# Patient Record
Sex: FEMALE | Race: WHITE
[De-identification: names, ages, dates, MRNs, and addresses within clinical notes are randomized per-mention and may not be internally consistent; named-entity substitution may affect disease eponyms.]

---

## 2017-06-02 ENCOUNTER — HOSPITAL ENCOUNTER (OUTPATIENT)
Dept: HOSPITAL 5 - TRG | Age: 32
Discharge: HOME | End: 2017-06-02
Attending: OBSTETRICS & GYNECOLOGY
Payer: MEDICAID

## 2017-06-02 DIAGNOSIS — Z3A.36: ICD-10-CM

## 2017-06-02 DIAGNOSIS — O47.03: Primary | ICD-10-CM

## 2017-06-02 PROCEDURE — 96360 HYDRATION IV INFUSION INIT: CPT

## 2017-06-03 VITALS — SYSTOLIC BLOOD PRESSURE: 122 MMHG | DIASTOLIC BLOOD PRESSURE: 70 MMHG

## 2019-11-21 ENCOUNTER — HOSPITAL ENCOUNTER (OUTPATIENT)
Dept: HOSPITAL 5 - TRG | Age: 34
Discharge: HOME | End: 2019-11-21
Attending: OBSTETRICS & GYNECOLOGY
Payer: COMMERCIAL

## 2019-11-21 VITALS — DIASTOLIC BLOOD PRESSURE: 66 MMHG | SYSTOLIC BLOOD PRESSURE: 120 MMHG

## 2019-11-21 DIAGNOSIS — O46.92: Primary | ICD-10-CM

## 2019-11-21 DIAGNOSIS — Z3A.20: ICD-10-CM

## 2019-11-21 LAB
BACTERIA #/AREA URNS HPF: (no result) /HPF
BILIRUB UR QL STRIP: (no result)
BLOOD UR QL VISUAL: (no result)
PH UR STRIP: 7 [PH] (ref 5–7)
PROT UR STRIP-MCNC: (no result) MG/DL
RBC #/AREA URNS HPF: 3 /HPF (ref 0–6)
UROBILINOGEN UR-MCNC: < 2 MG/DL (ref ?–2)
WBC #/AREA URNS HPF: 6 /HPF (ref 0–6)

## 2019-11-21 PROCEDURE — 81001 URINALYSIS AUTO W/SCOPE: CPT

## 2019-11-21 PROCEDURE — 76805 OB US >/= 14 WKS SNGL FETUS: CPT

## 2019-11-21 NOTE — ULTRASOUND REPORT
Obstetrical ultrasound. 11/21/2019.



HISTORY: Vaginal bleeding.



FINDINGS: A single viable intrauterine pregnancy in the breech presentation is dated 21 weeks 0 days.
 The placenta is located at the fundus. Negative for abruption.



Fetal heart tones are 143 bpm. Cervical length is 3.1 cm.



Estimated fetal weight is 411 g. Amniotic fluid is subjectively normal.



IMPRESSION:

1. Early viable intrauterine pregnancy dated 21 weeks 0 days.

2. Negative for abruption.



Signer Name: David Espinoza MD 

Signed: 11/21/2019 10:52 PM

 Workstation Name: [id number]

## 2019-12-27 ENCOUNTER — HOSPITAL ENCOUNTER (EMERGENCY)
Dept: HOSPITAL 5 - ED | Age: 34
Discharge: HOME | End: 2019-12-27
Payer: MEDICAID

## 2019-12-27 VITALS — DIASTOLIC BLOOD PRESSURE: 79 MMHG | SYSTOLIC BLOOD PRESSURE: 118 MMHG

## 2019-12-27 DIAGNOSIS — J11.1: ICD-10-CM

## 2019-12-27 DIAGNOSIS — O99.513: Primary | ICD-10-CM

## 2019-12-27 DIAGNOSIS — Z3A.28: ICD-10-CM

## 2019-12-27 PROCEDURE — 99282 EMERGENCY DEPT VISIT SF MDM: CPT

## 2019-12-27 NOTE — EVENT NOTE
ED Screening Note


ED Screening Note: 





 7 months pregnant


headache


fever 


body aches 


no vomiting 


no diarrhea 


yesterday 


no abd pain no vaginal 


no PMHx


no allergies to meds


OB/GYN: [facility]

## 2019-12-27 NOTE — EMERGENCY DEPARTMENT REPORT
- General


Chief Complaint: Weakness


Stated Complaint: COLD/FLU SX


Time Seen by Provider: 19 14:21


Source: patient


Mode of arrival: Ambulatory


Limitations: Language Barrier





- History of Present Illness


Initial Comments: 





pt is a 33 yo female who presents to the ED with c/o flu symptoms that began 

yesterday. she is currently 7 months pregnant. she has associated headache, feve

r. she denies any vomiting, diarrhea,  abd pain or vaginal bleeding. no PMHx. no

allergies to meds. OB/GYN: [facility]. her children at home also have flu 

like symptoms. 








- Related Data


                                Home Medications











 Medication  Instructions  Recorded  Confirmed  Last Taken


 


Prenatal Plus Multivitamin Tab 1 tab PO DAILY 16 09:00








                                  Previous Rx's











 Medication  Instructions  Recorded  Last Taken  Type


 


Pnv,Calcium 72/Iron/Folic Acid 1 each PO DAILY #30 tablet 07/18/15 Unknown Rx





[Prenatal Plus Tablet]    


 


Ibuprofen [Motrin 600 MG tab] 600 mg PO Q8H PRN #30 tablet 16 Unknown Rx


 


Multivitamin with Iron 1 each PO DAILY #30 tablet 16 Unknown Rx





[Multivitamins with Iron]    


 


oxyCODONE /ACETAMINOPHEN [Percocet 1 tab PO Q6HR PRN #30 tablet 16 Unknown

 Rx





5/325]    


 


medroxyPROGESTERone ACETATE 10 mg PO QDAY #10 tablet 16 Unknown Rx





[Provera]    


 


traMADoL [Ultram] 50 mg PO Q6HR PRN #14 tablet 16 Unknown Rx


 


Oseltamivir [Tamiflu] 75 mg PO BID 5 Days #10 cap 19 Unknown Rx











                                    Allergies











Allergy/AdvReac Type Severity Reaction Status Date / Time


 


No Known Allergies Allergy   Verified 19 08:51














ED Review of Systems


ROS: 


Stated complaint: COLD/FLU SX


Other details as noted in HPI





Comment: All other systems reviewed and negative





ED Past Medical Hx





- Past Medical History


Previous Medical History?: Yes


Hx Hypertension: No


Hx Congestive Heart Failure: No


Hx Diabetes: No


Hx Deep Vein Thrombosis: No


Hx Renal Disease: No


Hx Sickle Cell Disease: No


Hx Seizures: No


Hx Psychiatric Treatment: Yes (DEPRESSION)


Hx Asthma: No


Hx COPD: No


Hx HIV: No


Additional medical history: Pt had high blood pressure during pregnancy





- Surgical History


Past Surgical History?: Yes


Additional Surgical History:  x 3





- Social History


Smoking Status: Never Smoker





- Medications


Home Medications: 


                                Home Medications











 Medication  Instructions  Recorded  Confirmed  Last Taken  Type


 


Pnv,Calcium 72/Iron/Folic Acid 1 each PO DAILY #30 tablet 07/18/15  Unknown Rx





[Prenatal Plus Tablet]     


 


Prenatal Plus Multivitamin Tab 1 tab PO DAILY 16 09:00 

History


 


Ibuprofen [Motrin 600 MG tab] 600 mg PO Q8H PRN #30 tablet 16  Unknown Rx


 


Multivitamin with Iron 1 each PO DAILY #30 tablet 16  Unknown Rx





[Multivitamins with Iron]     


 


oxyCODONE /ACETAMINOPHEN [Percocet 1 tab PO Q6HR PRN #30 tablet 16  

Unknown Rx





5/325]     


 


medroxyPROGESTERone ACETATE 10 mg PO QDAY #10 tablet 16  Unknown Rx





[Provera]     


 


traMADoL [Ultram] 50 mg PO Q6HR PRN #14 tablet 16  Unknown Rx


 


Oseltamivir [Tamiflu] 75 mg PO BID 5 Days #10 cap 19  Unknown Rx














ED Physical Exam





- General


Limitations: No Limitations


General appearance: alert, in no apparent distress





- Head


Head exam: Present: atraumatic, normocephalic





- Eye


Eye exam: Present: normal appearance





- ENT


ENT exam: Present: normal orophraynx, mucous membranes moist, TM's normal 

bilaterally, normal external ear exam, other (clear nasal drainage)





- Respiratory


Respiratory exam: Present: normal lung sounds bilaterally.  Absent: respiratory 

distress, wheezes, rales, rhonchi, stridor, chest wall tenderness, accessory 

muscle use, decreased breath sounds, prolonged expiratory





- Cardiovascular


Cardiovascular Exam: Present: regular rate, normal rhythm, normal heart sounds. 

 Absent: systolic murmur, diastolic murmur, rubs, gallop





- Neurological Exam


Neurological exam: Present: alert, oriented X3





- Psychiatric


Psychiatric exam: Present: normal affect, normal mood





- Skin


Skin exam: Present: warm, dry, intact





ED Course


                                   Vital Signs











  19





  14:21 16:49


 


Temperature 98.6 F 


 


Pulse Rate 97 H 


 


Respiratory 18 18





Rate  


 


Blood Pressure 118/79 


 


Blood Pressure 118/79 





[Right]  


 


O2 Sat by Pulse 98 





Oximetry  














ED Medical Decision Making





- Medical Decision Making





pt is a 33 yo female who presents to the ED with c/o flu symptoms that began 

yesterday. she is currently 7 months pregnant. she has associated headache, 

fever. she denies any vomiting, diarrhea,  abd pain or vaginal bleeding. no 

PMHx. no allergies to meds. OB/GYN: [facility]. her children at home also 

have flu like symptoms. vitals are stable. normal oropharynx, normal TMs and 

canals, breath sounds are clear bilaterally.  Patient has clinical signs and 

symptoms of influenza and has had sick contacts.  Will place patient on tamiflu.

 advised pt please take medication as prescribed. increase your fluid intake 

over the next several days.may take tylenol for headache or fever. may use a 

humidifier. follow up with your primary care doctor and OB/GYN in the next 2-3 

days for reevaluation. return to the emergency room immediately for any new or 

worsening symptoms. 








language interpretation by 





- Differential Diagnosis


influenza, URI, PNA, otitis, pharyngitis, sinusitis 


Critical care attestation.: 


If time is entered above; I have spent that time in minutes in the direct care 

of this critically ill patient, excluding procedure time.








ED Disposition


Clinical Impression: 


 Influenza





Disposition: DC-01 TO HOME OR SELFCARE


Is pt being admited?: No


Does the pt Need Aspirin: No


Condition: Stable


Instructions:  Influenza (ED)


Additional Instructions: 


please take medication as prescribed. increase your fluid intake over the next 

several days.may take tylenol for headache or fever. may use a humidifier. follo

w up with your primary care doctor and OB/GYN in the next 2-3 days for 

reevaluation. return to the emergency room immediately for any new or worsening 

symptoms. 








por favor tome la medicacin segn lo prescrito. aumente la ingesta de lquidos 

en los prximos betancourt. Puede mora tylenol para el dolor de dalila o la fiebre. 

puede usar un humidificador. jeff un seguimiento con hull mdico de atencin pr

imaria y obstetra / gineclogo en los prximos 2-3 betancourt para la reevaluacin. 

Regrese a la son de emergencias de inmediato por cualquier sntoma nuevo o que 

empeore.


Prescriptions: 


Oseltamivir [Tamiflu] 75 mg PO BID 5 Days #10 cap


Referrals: 


your, primary care doctor [Other] - 2-3 Days


your, OB/GYN [Other] - 2-3 Days


Time of Disposition: 15:14


Print Language: Arabic

## 2022-06-13 ENCOUNTER — HOSPITAL ENCOUNTER (INPATIENT)
Dept: HOSPITAL 5 - ED | Age: 37
LOS: 4 days | Discharge: HOME | DRG: 419 | End: 2022-06-17
Attending: INTERNAL MEDICINE | Admitting: HOSPITALIST
Payer: SELF-PAY

## 2022-06-13 DIAGNOSIS — K80.60: Primary | ICD-10-CM

## 2022-06-13 DIAGNOSIS — I10: ICD-10-CM

## 2022-06-13 DIAGNOSIS — F32.9: ICD-10-CM

## 2022-06-13 LAB
ALBUMIN SERPL-MCNC: 4.5 G/DL (ref 3.9–5)
ALT SERPL-CCNC: 24 UNITS/L (ref 7–56)
BASOPHILS # (AUTO): 0 K/MM3 (ref 0–0.1)
BASOPHILS NFR BLD AUTO: 0.2 % (ref 0–1.8)
BUN SERPL-MCNC: 9 MG/DL (ref 7–17)
BUN/CREAT SERPL: 23 %
CALCIUM SERPL-MCNC: 9.1 MG/DL (ref 8.4–10.2)
EOSINOPHIL # BLD AUTO: 0.1 K/MM3 (ref 0–0.4)
EOSINOPHIL NFR BLD AUTO: 0.4 % (ref 0–4.3)
HCT VFR BLD CALC: 33.7 % (ref 30.3–42.9)
HEMOLYSIS INDEX: 9
HGB BLD-MCNC: 11.3 GM/DL (ref 10.1–14.3)
LYMPHOCYTES # BLD AUTO: 1.8 K/MM3 (ref 1.2–5.4)
LYMPHOCYTES NFR BLD AUTO: 14.4 % (ref 13.4–35)
MCHC RBC AUTO-ENTMCNC: 34 % (ref 30–34)
MCV RBC AUTO: 82 FL (ref 79–97)
MONOCYTES # (AUTO): 0.8 K/MM3 (ref 0–0.8)
MONOCYTES % (AUTO): 6.8 % (ref 0–7.3)
PLATELET # BLD: 258 K/MM3 (ref 140–440)
RBC # BLD AUTO: 4.13 M/MM3 (ref 3.65–5.03)

## 2022-06-13 PROCEDURE — 80048 BASIC METABOLIC PNL TOTAL CA: CPT

## 2022-06-13 PROCEDURE — 81001 URINALYSIS AUTO W/SCOPE: CPT

## 2022-06-13 PROCEDURE — 83690 ASSAY OF LIPASE: CPT

## 2022-06-13 PROCEDURE — 84702 CHORIONIC GONADOTROPIN TEST: CPT

## 2022-06-13 PROCEDURE — C9113 INJ PANTOPRAZOLE SODIUM, VIA: HCPCS

## 2022-06-13 PROCEDURE — 36415 COLL VENOUS BLD VENIPUNCTURE: CPT

## 2022-06-13 PROCEDURE — 76705 ECHO EXAM OF ABDOMEN: CPT

## 2022-06-13 PROCEDURE — 85025 COMPLETE CBC W/AUTO DIFF WBC: CPT

## 2022-06-13 PROCEDURE — 88304 TISSUE EXAM BY PATHOLOGIST: CPT

## 2022-06-13 PROCEDURE — 80053 COMPREHEN METABOLIC PANEL: CPT

## 2022-06-13 NOTE — EMERGENCY DEPARTMENT REPORT
ED Abdominal Pain HPI





- General


Chief Complaint: Abdominal Pain


Stated Complaint: STOMACH PAIN


Time Seen by Provider: 22 23:08


Source: patient


Mode of arrival: Ambulatory


Limitations: Language Barrier





- History of Present Illness


Initial Comments: 





36-year-old female no significant past medical history presents to the hospital 

from urgent care clinic with abdominal pain.  Patient has had upper quadrant 

pain since this morning with associated nausea and vomiting.  No fever reported.

 hx of  x 3.  Pain is moderate to severe in intensity, worse with 

palpation, and no alleviating factors reported.  No urinary symptoms reported.


Severity scale (0 -10): 10





- Related Data


                                Home Medications











 Medication  Instructions  Recorded  Confirmed  Last Taken


 


Prenatal Plus Multivitamin Tab 1 tab PO DAILY 16 09:00








                                  Previous Rx's











 Medication  Instructions  Recorded  Last Taken  Type


 


Pnv,Calcium 72/Iron/Folic Acid 1 each PO DAILY #30 tablet 07/18/15 Unknown Rx





[Prenatal Plus Tablet]    


 


Ibuprofen [Motrin 600 MG tab] 600 mg PO Q8H PRN #30 tablet 16 Unknown Rx


 


Multivitamin with Iron 1 each PO DAILY #30 tablet 16 Unknown Rx





[Multivitamins with Iron]    


 


oxyCODONE /ACETAMINOPHEN [Percocet 1 tab PO Q6HR PRN #30 tablet 16 Unknown

 Rx





5/325]    


 


medroxyPROGESTERone ACETATE 10 mg PO QDAY #10 tablet 16 Unknown Rx





[Provera]    


 


traMADoL [Ultram] 50 mg PO Q6HR PRN #14 tablet 16 Unknown Rx


 


Oseltamivir [Tamiflu] 75 mg PO BID 5 Days #10 cap 19 Unknown Rx











                                    Allergies











Allergy/AdvReac Type Severity Reaction Status Date / Time


 


No Known Allergies Allergy   Verified 19 08:51














ED Review of Systems


ROS: 


Stated complaint: STOMACH PAIN


Other details as noted in HPI





Comment: All other systems reviewed and negative





ED Past Medical Hx





- Past Medical History


Hx Hypertension: No


Hx Congestive Heart Failure: No


Hx Diabetes: No


Hx Deep Vein Thrombosis: No


Hx Renal Disease: No


Hx Sickle Cell Disease: No


Hx Seizures: No


Hx Psychiatric Treatment: Yes (DEPRESSION)


Hx Asthma: No


Hx COPD: No


Hx HIV: No


Additional medical history: Pt had high blood pressure during pregnancy





- Surgical History


Additional Surgical History:  x 3





- Social History


Smoking Status: Never Smoker





- Medications


Home Medications: 


                                Home Medications











 Medication  Instructions  Recorded  Confirmed  Last Taken  Type


 


Pnv,Calcium 72/Iron/Folic Acid 1 each PO DAILY #30 tablet 07/18/15  Unknown Rx





[Prenatal Plus Tablet]     


 


Prenatal Plus Multivitamin Tab 1 tab PO DAILY 16 09:00 

History


 


Ibuprofen [Motrin 600 MG tab] 600 mg PO Q8H PRN #30 tablet 16  Unknown Rx


 


Multivitamin with Iron 1 each PO DAILY #30 tablet 16  Unknown Rx





[Multivitamins with Iron]     


 


oxyCODONE /ACETAMINOPHEN [Percocet 1 tab PO Q6HR PRN #30 tablet 16  

Unknown Rx





5/325]     


 


medroxyPROGESTERone ACETATE 10 mg PO QDAY #10 tablet 16  Unknown Rx





[Provera]     


 


traMADoL [Ultram] 50 mg PO Q6HR PRN #14 tablet 16  Unknown Rx


 


Oseltamivir [Tamiflu] 75 mg PO BID 5 Days #10 cap 19  Unknown Rx














ED Physical Exam





- General


Limitations: Language Barrier





- Other


Other exam information: 





General: Mild distress secondary to pain


Head: Atraumatic


Eyes: normal appearance


ENT: Moist mucous membranes


Neck: Normal appearance, no midline tenderness


Chest: Clear to auscultation bilaterally


CV: Regular rate and rhythm


Abdomen: Soft, normal bowel sounds, right upper quadrant tenderness, 

nondistended, no rebound or guarding


Back: Normal inspection


Extremity: Normal inspection, full range of motion


Neuro: Alert O x 3, no facial asymmetry, speech clear, no gross motor sensory 

deficit


Psych: Appropriate behavior


Skin: No rash





ED Course


                                   Vital Signs











  22





  20:47


 


Temperature 98.1 F


 


Pulse Rate 64


 


Respiratory 16





Rate 


 


Blood Pressure 147/79


 


O2 Sat by Pulse 100





Oximetry 














- Reevaluation(s)


Reevaluation #1: 





22 02:02


Patient still having pain and vomited despite morphine and Zofran in the ED





ED Medical Decision Making





- Lab Data


Result diagrams: 


                                 22 20:50





                                 22 20:50








                                   Lab Results











  22 Range/Units





  20:50 20:50 20:50 


 


WBC  12.2 H    (4.5-11.0)  K/mm3


 


RBC  4.13    (3.65-5.03)  M/mm3


 


Hgb  11.3    (10.1-14.3)  gm/dl


 


Hct  33.7    (30.3-42.9)  %


 


MCV  82    (79-97)  fl


 


MCH  27 L    (28-32)  pg


 


MCHC  34    (30-34)  %


 


RDW  15.5 H    (13.2-15.2)  %


 


Plt Count  258    (140-440)  K/mm3


 


Lymph % (Auto)  14.4    (13.4-35.0)  %


 


Mono % (Auto)  6.8    (0.0-7.3)  %


 


Eos % (Auto)  0.4    (0.0-4.3)  %


 


Baso % (Auto)  0.2    (0.0-1.8)  %


 


Lymph # (Auto)  1.8    (1.2-5.4)  K/mm3


 


Mono # (Auto)  0.8    (0.0-0.8)  K/mm3


 


Eos # (Auto)  0.1    (0.0-0.4)  K/mm3


 


Baso # (Auto)  0.0    (0.0-0.1)  K/mm3


 


Seg Neutrophils %  78.2 H    (40.0-70.0)  %


 


Seg Neutrophils #  9.5 H    (1.8-7.7)  K/mm3


 


Sodium   136 L   (137-145)  mmol/L


 


Potassium   3.7   (3.6-5.0)  mmol/L


 


Chloride   99.2   ()  mmol/L


 


Carbon Dioxide   22   (22-30)  mmol/L


 


Anion Gap   19   mmol/L


 


BUN   9   (7-17)  mg/dL


 


Creatinine   0.4 L   (0.6-1.2)  mg/dL


 


Estimated GFR   > 60   ml/min


 


BUN/Creatinine Ratio   23   %


 


Glucose   107 H   ()  mg/dL


 


Calcium   9.1   (8.4-10.2)  mg/dL


 


Total Bilirubin   0.30   (0.1-1.2)  mg/dL


 


AST   18   (5-40)  units/L


 


ALT   24   (7-56)  units/L


 


Alkaline Phosphatase   92   ()  units/L


 


Total Protein   7.8   (6.3-8.2)  g/dL


 


Albumin   4.5   (3.9-5)  g/dL


 


Albumin/Globulin Ratio   1.4   %


 


Lipase    30  (13-60)  units/L


 


HCG, Quant     (0-4)  mIU/mL


 


Urine Bilirubin     (Negative)  














  22 Range/Units





  21:31 02:06 


 


WBC    (4.5-11.0)  K/mm3


 


RBC    (3.65-5.03)  M/mm3


 


Hgb    (10.1-14.3)  gm/dl


 


Hct    (30.3-42.9)  %


 


MCV    (79-97)  fl


 


MCH    (28-32)  pg


 


MCHC    (30-34)  %


 


RDW    (13.2-15.2)  %


 


Plt Count    (140-440)  K/mm3


 


Lymph % (Auto)    (13.4-35.0)  %


 


Mono % (Auto)    (0.0-7.3)  %


 


Eos % (Auto)    (0.0-4.3)  %


 


Baso % (Auto)    (0.0-1.8)  %


 


Lymph # (Auto)    (1.2-5.4)  K/mm3


 


Mono # (Auto)    (0.0-0.8)  K/mm3


 


Eos # (Auto)    (0.0-0.4)  K/mm3


 


Baso # (Auto)    (0.0-0.1)  K/mm3


 


Seg Neutrophils %    (40.0-70.0)  %


 


Seg Neutrophils #    (1.8-7.7)  K/mm3


 


Sodium    (137-145)  mmol/L


 


Potassium    (3.6-5.0)  mmol/L


 


Chloride    ()  mmol/L


 


Carbon Dioxide    (22-30)  mmol/L


 


Anion Gap    mmol/L


 


BUN    (7-17)  mg/dL


 


Creatinine    (0.6-1.2)  mg/dL


 


Estimated GFR    ml/min


 


BUN/Creatinine Ratio    %


 


Glucose    ()  mg/dL


 


Calcium    (8.4-10.2)  mg/dL


 


Total Bilirubin    (0.1-1.2)  mg/dL


 


AST    (5-40)  units/L


 


ALT    (7-56)  units/L


 


Alkaline Phosphatase    ()  units/L


 


Total Protein    (6.3-8.2)  g/dL


 


Albumin    (3.9-5)  g/dL


 


Albumin/Globulin Ratio    %


 


Lipase    (13-60)  units/L


 


HCG, Quant  < 2   (0-4)  mIU/mL


 


Urine Bilirubin   Neg  (Negative)  














- Radiology Data


Radiology results: report reviewed





 


ULTRASOUND ABDOMEN, LIMITED  


 


 INDICATION / CLINICAL INFORMATION: ruq pain n, v.  


 


 COMPARISON: None available.  


 


 TECHNIQUE: Using transcutaneous protocol multiple grayscale and color Doppler 

images were captured 


and stored of the pancreas, liver, aorta, inferior vena cava, gallbladder, 

common bile duct, and 


right kidney.  


 


 FINDINGS:  


 PANCREAS: Visualized portion shows no significant abnormality.  


 AORTA: Longitudinal images of the aorta demonstrate no significant abnormality.

  


 IVC: Longitudinal images of the inferior vena cava demonstrate no significant 

abnormality.  


 LIVER: Right hepatic lobe measures 18.5 cm. Nonspecific minimally pulsatile 

hepatopedal blood flow 


in the main portal vein.  


 GALLBLADDER: Gallstones within the neck of the gallbladder. Gallbladder is 

distended measuring 15.6


cm. No wall thickening.   


 BILE DUCTS: No significant abnormality. Common bile duct measures 3.3 mm.  


 RIGHT KIDNEY: No acute findings are suggested to involve the right kidney.  


 


 FREE FLUID: None.  


 ADDITIONAL FINDINGS: None.  


 


 IMPRESSION:  


 1. Distended gallbladder with stone impacted within gallbladder neck. No pe

richolecystic fluid. 


Acute cholecystitis and/or gallbladder hydrops not excluded.  





- Medical Decision Making





36-year-old female presents to the hospital upper quadrant pain, nausea, and 

vomiting.  Patient continues to have symptoms despite ED treatment.  CT findings

 show gallbladder neck stone and distention.  Cannot rule out acute 

cholecystitis.  Patient has mild elevation in WBC count without elevated LFTs, 

lipase, fever, or tachycardia.  IV Rocephin ordered.  Case discussed with 

general surgery who will consult.  Patient will be admitted to the hospitalist.





- Differential Diagnosis


Pancreatitis, cholecystitis, appendicitis, renal colic, UTI


Critical Care Time: No


Critical care attestation.: 


If time is entered above; I have spent that time in minutes in the direct care 

of this critically ill patient, excluding procedure time.








ED Disposition


Clinical Impression: 


 Biliary colic, Intractable abdominal pain, Intractable nausea and vomiting, 

Cholelithiasis





Disposition:  ADMITTED AS INPATIENT


Is pt being admited?: Yes


Condition: Stable


Instructions:  Abdominal Pain (ED)


Time of Disposition: 02:19 (Dr Webb/[facility])

## 2022-06-14 LAB
BILIRUB UR QL STRIP: (no result)
BLOOD UR QL VISUAL: (no result)
HGB S BLD QL SOLY: (no result)
PH UR STRIP: 8 [PH] (ref 5–7)
PROT UR STRIP-MCNC: (no result) MG/DL
UROBILINOGEN UR-MCNC: < 2 MG/DL (ref ?–2)

## 2022-06-14 RX ADMIN — PANTOPRAZOLE SODIUM SCH MG: 40 INJECTION, POWDER, FOR SOLUTION INTRAVENOUS at 18:45

## 2022-06-14 RX ADMIN — Medication SCH ML: at 23:03

## 2022-06-14 RX ADMIN — PANTOPRAZOLE SODIUM SCH MG: 40 INJECTION, POWDER, FOR SOLUTION INTRAVENOUS at 21:37

## 2022-06-14 RX ADMIN — FAMOTIDINE SCH MG: 10 INJECTION, SOLUTION INTRAVENOUS at 11:02

## 2022-06-14 RX ADMIN — PIPERACILLIN AND TAZOBACTAM SCH MLS/HR: 4; .5 INJECTION, POWDER, FOR SOLUTION INTRAVENOUS at 11:02

## 2022-06-14 RX ADMIN — MORPHINE SULFATE PRN MG: 4 INJECTION, SOLUTION INTRAMUSCULAR; INTRAVENOUS at 18:45

## 2022-06-14 RX ADMIN — DEXTROSE AND SODIUM CHLORIDE SCH MLS/HR: 5; .45 INJECTION, SOLUTION INTRAVENOUS at 22:57

## 2022-06-14 RX ADMIN — FAMOTIDINE SCH MG: 10 INJECTION, SOLUTION INTRAVENOUS at 21:37

## 2022-06-14 RX ADMIN — HEPARIN SODIUM SCH UNIT: 5000 INJECTION, SOLUTION INTRAVENOUS; SUBCUTANEOUS at 21:37

## 2022-06-14 RX ADMIN — MORPHINE SULFATE PRN MG: 4 INJECTION, SOLUTION INTRAMUSCULAR; INTRAVENOUS at 11:02

## 2022-06-14 RX ADMIN — ONDANSETRON PRN MG: 2 INJECTION INTRAMUSCULAR; INTRAVENOUS at 23:00

## 2022-06-14 RX ADMIN — MORPHINE SULFATE PRN MG: 4 INJECTION, SOLUTION INTRAMUSCULAR; INTRAVENOUS at 08:11

## 2022-06-14 RX ADMIN — MORPHINE SULFATE PRN MG: 4 INJECTION, SOLUTION INTRAMUSCULAR; INTRAVENOUS at 23:08

## 2022-06-14 RX ADMIN — MORPHINE SULFATE PRN MG: 4 INJECTION, SOLUTION INTRAMUSCULAR; INTRAVENOUS at 16:10

## 2022-06-14 RX ADMIN — MULTIVITAMIN TABLET SCH EACH: TABLET at 11:02

## 2022-06-14 RX ADMIN — PIPERACILLIN AND TAZOBACTAM SCH MLS/HR: 4; .5 INJECTION, POWDER, FOR SOLUTION INTRAVENOUS at 19:37

## 2022-06-14 RX ADMIN — ONDANSETRON PRN MG: 2 INJECTION INTRAMUSCULAR; INTRAVENOUS at 16:11

## 2022-06-14 RX ADMIN — HEPARIN SODIUM SCH UNIT: 5000 INJECTION, SOLUTION INTRAVENOUS; SUBCUTANEOUS at 11:01

## 2022-06-14 RX ADMIN — Medication SCH ML: at 11:02

## 2022-06-14 NOTE — ULTRASOUND REPORT
ULTRASOUND ABDOMEN, LIMITED



INDICATION / CLINICAL INFORMATION: ruq pain n, v.

 

COMPARISON: None available.



TECHNIQUE: Using transcutaneous protocol multiple grayscale and color Doppler images were captured an
d stored of the pancreas, liver, aorta, inferior vena cava, gallbladder, common bile duct, and right 
kidney.



FINDINGS:

PANCREAS: Visualized portion shows no significant abnormality.

AORTA: Longitudinal images of the aorta demonstrate no significant abnormality.

IVC: Longitudinal images of the inferior vena cava demonstrate no significant abnormality.

LIVER: Right hepatic lobe measures 18.5 cm. Nonspecific minimally pulsatile hepatopedal blood flow in
 the main portal vein.

GALLBLADDER: Gallstones within the neck of the gallbladder. Gallbladder is distended measuring 15.6 c
m. No wall thickening. 

BILE DUCTS: No significant abnormality. Common bile duct measures 3.3 mm.

RIGHT KIDNEY: No acute findings are suggested to involve the right kidney.



FREE FLUID: None.

ADDITIONAL FINDINGS: None.



IMPRESSION:

1. Distended gallbladder with stone impacted within gallbladder neck. No pericholecystic fluid. Acute
 cholecystitis and/or gallbladder hydrops not excluded.



Signer Name: Xavier Pickering II, MD 

Signed: 6/14/2022 12:53 AM

Workstation Name: [id number]

## 2022-06-14 NOTE — HISTORY AND PHYSICAL REPORT
History of Present Illness


Date of examination: 22


Date of admission: 


22


Chief complaint: 





Abdominal pain


History of present illness: 





6-year-old female no significant past medical history presents to the hospital 

from urgent care clinic with abdominal pain.  Patient has had upper quadrant 

pain since this morning with associated nausea and vomiting.  No fever reported.

 hx of  x 3.  Pain is moderate to severe in intensity, worse with 

palpation, and no alleviating factors reported.  No urinary symptoms reported.





 CT findings show gallbladder neck stone and distention.  Cannot rule out acute 

cholecystitis.  Patient has mild elevation in WBC count without elevated LFTs, 

lipase, fever, or tachycardia.  Case discussed with general surgery who will 

consult.  Patient will be admitted to the hospitalist.








Past History


Past Medical History: hypertension, other (Depression)


Past Surgical History: 


Social history: no significant social history


Family history: no significant family history





Medications and Allergies


                                    Allergies











Allergy/AdvReac Type Severity Reaction Status Date / Time


 


No Known Allergies Allergy   Verified 19 08:51











                                Home Medications











 Medication  Instructions  Recorded  Confirmed  Last Taken  Type


 


Pnv,Calcium 72/Iron/Folic Acid 1 each PO DAILY #30 tablet 07/18/15  Unknown Rx





[Prenatal Plus Tablet]     


 


Prenatal Plus Multivitamin Tab 1 tab PO DAILY 16 09:00 

History


 


Ibuprofen [Motrin 600 MG tab] 600 mg PO Q8H PRN #30 tablet 16  Unknown Rx


 


Multivitamin with Iron 1 each PO DAILY #30 tablet 16  Unknown Rx





[Multivitamins with Iron]     


 


oxyCODONE /ACETAMINOPHEN [Percocet 1 tab PO Q6HR PRN #30 tablet 16  

Unknown Rx





5/325]     


 


medroxyPROGESTERone ACETATE 10 mg PO QDAY #10 tablet 16  Unknown Rx





[Provera]     


 


traMADoL [Ultram] 50 mg PO Q6HR PRN #14 tablet 16  Unknown Rx


 


Oseltamivir [Tamiflu] 75 mg PO BID 5 Days #10 cap 19  Unknown Rx











Active Meds: 


Active Medications





Ceftriaxone Sodium (Rocephin/Ns 1 Gm/50 Ml)  1 gm in 50 mls @ 100 mls/hr IV ONCE

 ONE; Protocol


   Stop: 22 02:47











Review of Systems


All systems: negative


Gastrointestinal: abdominal pain, nausea, vomiting





Exam





- Constitutional


Vitals: 


                                        











Temp Pulse Resp BP Pulse Ox


 


 98.1 F   64   16   147/79   100 


 


 22 20:47  22 20:47  22 20:47  22 20:47  22 20:47











General appearance: Present: no acute distress, well-nourished





- EENT


Eyes: Present: PERRL


ENT: hearing intact, clear oral mucosa





- Neck


Neck: Present: supple, normal ROM





- Respiratory


Respiratory effort: normal


Respiratory: bilateral: CTA





- Cardiovascular


Heart Sounds: Present: S1 & S2.  Absent: rub, click





- Extremities


Extremities: pulses symmetrical, No edema


Peripheral Pulses: within normal limits





- Abdominal


General gastrointestinal: Present: soft, non-tender, non-distended, normal bowel

 sounds


Female genitourinary: Present: normal





- Integumentary


Integumentary: Present: clear, warm, dry





- Musculoskeletal


Musculoskeletal: gait normal, strength equal bilaterally





- Psychiatric


Psychiatric: appropriate mood/affect, intact judgment & insight





- Neurologic


Neurologic: CNII-XII intact, moves all extremities





Results





- Labs


CBC & Chem 7: 


                                 22 20:50





                                 22 20:50


Labs: 


                             Laboratory Last Values











WBC  12.2 K/mm3 (4.5-11.0)  H  22  20:50    


 


RBC  4.13 M/mm3 (3.65-5.03)   22  20:50    


 


Hgb  11.3 gm/dl (10.1-14.3)   22  20:50    


 


Hct  33.7 % (30.3-42.9)   22  20:50    


 


MCV  82 fl (79-97)   22  20:50    


 


MCH  27 pg (28-32)  L  22  20:50    


 


MCHC  34 % (30-34)   22  20:50    


 


RDW  15.5 % (13.2-15.2)  H  22  20:50    


 


Plt Count  258 K/mm3 (140-440)   22  20:50    


 


Lymph % (Auto)  14.4 % (13.4-35.0)   22  20:50    


 


Mono % (Auto)  6.8 % (0.0-7.3)   22  20:50    


 


Eos % (Auto)  0.4 % (0.0-4.3)   22  20:50    


 


Baso % (Auto)  0.2 % (0.0-1.8)   22  20:50    


 


Lymph # (Auto)  1.8 K/mm3 (1.2-5.4)   22  20:50    


 


Mono # (Auto)  0.8 K/mm3 (0.0-0.8)   22  20:50    


 


Eos # (Auto)  0.1 K/mm3 (0.0-0.4)   22  20:50    


 


Baso # (Auto)  0.0 K/mm3 (0.0-0.1)   22  20:50    


 


Seg Neutrophils %  78.2 % (40.0-70.0)  H  22  20:50    


 


Seg Neutrophils #  9.5 K/mm3 (1.8-7.7)  H  22  20:50    


 


Sodium  136 mmol/L (137-145)  L  22  20:50    


 


Potassium  3.7 mmol/L (3.6-5.0)   22  20:50    


 


Chloride  99.2 mmol/L ()   22  20:50    


 


Carbon Dioxide  22 mmol/L (22-30)   22  20:50    


 


Anion Gap  19 mmol/L  22  20:50    


 


BUN  9 mg/dL (7-17)   22  20:50    


 


Creatinine  0.4 mg/dL (0.6-1.2)  L  22  20:50    


 


Estimated GFR  > 60 ml/min  22  20:50    


 


BUN/Creatinine Ratio  23 %  22  20:50    


 


Glucose  107 mg/dL ()  H  22  20:50    


 


Calcium  9.1 mg/dL (8.4-10.2)   22  20:50    


 


Total Bilirubin  0.30 mg/dL (0.1-1.2)   22  20:50    


 


AST  18 units/L (5-40)   22  20:50    


 


ALT  24 units/L (7-56)   22  20:50    


 


Alkaline Phosphatase  92 units/L ()   22  20:50    


 


Total Protein  7.8 g/dL (6.3-8.2)   22  20:50    


 


Albumin  4.5 g/dL (3.9-5)   22  20:50    


 


Albumin/Globulin Ratio  1.4 %  22  20:50    


 


Lipase  30 units/L (13-60)   22  20:50    


 


HCG, Quant  < 2 mIU/mL (0-4)   22  21:31    


 


Urine Color  Yellow  (Yellow)   22  02:06    


 


Urine Turbidity  Cloudy  (Clear)   22  02:06    


 


Urine pH  8.0  (5.0-7.0)  H  22  02:06    


 


Ur Specific Gravity  1.013  (1.003-1.030)   22  02:06    


 


Urine Protein  <15 mg/dl mg/dL (Negative)   22  02:06    


 


Urine Glucose (UA)  Neg mg/dL (Negative)   22  02:06    


 


Urine Ketones  Neg mg/dL (Negative)   22  02:06    


 


Urine Blood  Neg  (Negative)   22  02:06    


 


Urine Nitrite  Neg  (Negative)   22  02:06    


 


Urine Bilirubin  Neg  (Negative)   22  02:06    


 


Urine Urobilinogen  < 2.0 mg/dL (<2.0)   22  02:06    


 


Ur Leukocyte Esterase  Tr  (Negative)   22  02:06    


 


Urine WBC (Auto)  Not Reportable   22  02:06    


 


Urine RBC (Auto)  Not Reportable   22  02:06    


 


Amorphous Crystals  3+   22  02:06    














- Imaging and Cardiology


CT scan - abdomen: report reviewed





Assessment and Plan


VTE prophylaxis?: Chemical


Plan of care discussed with patient/family: Yes





- Patient Problems


(1) Cholelithiasis


Status: Acute   


Plan to address problem: 


Admit the patient to the medical floor.  NPO.  D5 half-normal saline at the rate

 of 100 cc/h.  Pepcid 20 mg IV every 12 hours.  Morphine 2 mg IV every 4 hours. 

 Surgical evaluation.  Zosyn 4.5 g IV every 8 hours.  Recheck CBC CMP in the 

morning








(2) Intractable abdominal pain


Status: Acute   


Plan to address problem: 


 NPO.  D5 half-normal saline at the rate of 100 cc/h.  Pepcid 20 mg IV every 12 

hours.  Morphine 2 mg IV every 4 hours.  Surgical evaluation. 








(3) Intractable nausea and vomiting


Status: Acute   


Plan to address problem: 


 NPO.  D5 half-normal saline at the rate of 100 cc/h.  Pepcid 20 mg IV every 12 

hours.  Zofran 4 mg IV every 6 hours as needed








(4) History of 2  sections


Status: Acute   


Plan to address problem: 


Stable.








(5) DVT prophylaxis


Status: Acute   


Plan to address problem: 


Heparin 5000 units subcu every 12 hours for DVT prophylaxis.  Pepcid 20 mg IV 

every 12 hours for GI prophylaxis.  Patient is a full code

## 2022-06-14 NOTE — CONSULTATION
History of Present Illness


Consult date: 22


Reason for consult: gallstones





- History of present illness


History of present illness: 





36-year-old female admitted to the emergency room with a 2-day course of right 

upper quadrant epigastric pain with nausea and vomiting.  She denies having this

pain before.  Patient had abdominal ultrasound that showed distended gallbladder

with a gallstone stuck in the neck of the gallbladder.  Patient says her pain is

10 out of 10 and radiates to the back on the right side.





Past History


Past Medical History: hypertension, other (Depression)


Past Surgical History: 


Social history: no significant social history


Family history: no significant family history





Medications and Allergies


                                    Allergies











Allergy/AdvReac Type Severity Reaction Status Date / Time


 


No Known Allergies Allergy   Verified 19 08:51











                                Home Medications











 Medication  Instructions  Recorded  Confirmed  Last Taken  Type


 


Pnv,Calcium 72/Iron/Folic Acid 1 each PO DAILY #30 tablet 07/18/15  Unknown Rx





[Prenatal Plus Tablet]     


 


Prenatal Plus Multivitamin Tab 1 tab PO DAILY 16 09:00 

History


 


Ibuprofen [Motrin 600 MG tab] 600 mg PO Q8H PRN #30 tablet 16  Unknown Rx


 


Multivitamin with Iron 1 each PO DAILY #30 tablet 16  Unknown Rx





[Multivitamins with Iron]     


 


oxyCODONE /ACETAMINOPHEN [Percocet 1 tab PO Q6HR PRN #30 tablet 16  

Unknown Rx





5/325]     


 


medroxyPROGESTERone ACETATE 10 mg PO QDAY #10 tablet 16  Unknown Rx





[Provera]     


 


traMADoL [Ultram] 50 mg PO Q6HR PRN #14 tablet 16  Unknown Rx


 


Oseltamivir [Tamiflu] 75 mg PO BID 5 Days #10 cap 19  Unknown Rx











Active Meds: 


Active Medications





Acetaminophen (Acetaminophen 325 Mg Tab)  650 mg PO Q4H PRN


   PRN Reason: Pain MILD(1-3)/Fever >100.5/HA


Albuterol (Albuterol 2.5 Mg/3 Ml Nebu)  2.5 mg IH Q3HRT PRN


   PRN Reason: Shortness Of Breath


Famotidine (Famotidine 20 Mg/2 Ml Inj)  20 mg IV BID LEAH


   Last Admin: 22 11:02 Dose:  20 mg


   


Heparin Sodium (Porcine) (Heparin 5,000 Unit/1 Ml Vial)  5,000 unit SUB-Q Q12HR 

[facility]


   Last Admin: 22 11:01 Dose:  5,000 unit


   


Dextrose/Sodium Chloride (D5/0.45ns)  1,000 mls @ 100 mls/hr IV AS DIRECT LEAH


Piperacillin Sod/Tazobactam Sod (Zosyn/Ns 4.5gm/100ml)  4.5 gm in 100 mls @ 200 

mls/hr IV Q8H [facility]; Protocol


   Last Admin: 22 11:02 Dose:  200 mls/hr


   


Morphine Sulfate (Morphine 2 Mg/1 Ml Inj)  2 mg IV Q4H PRN


   PRN Reason: Pain, Moderate (4-6)


Morphine Sulfate (Morphine 4 Mg/1 Ml Inj)  4 mg IV Q4H PRN


   PRN Reason: Pain , Severe (7-10)


   Last Admin: 22 11:02 Dose:  4 mg


   


Multivitamins (Multivitamins ,Therapeutic Tab)  1 each PO DAILY [facility]


   Last Admin: 22 11:02 Dose:  1 each


   


Ondansetron HCl (Ondansetron 4 Mg/2 Ml Inj)  4 mg IV Q8H PRN


   PRN Reason: Nausea And Vomiting


Sodium Chloride (Sodium Chloride 0.9% 10 Ml Flush Syringe)  10 ml IV BID [facility]


   Last Admin: 22 11:02 Dose:  10 ml


   


Sodium Chloride (Sodium Chloride 0.9% 10 Ml Flush Syringe)  10 ml IV PRN PRN


   PRN Reason: LINE FLUSH











Review of Systems


All systems: negative





- Constitutional


poor appetite





- Gastrointestinal


abdominal pain, nausea, vomiting, dyspepsia/bloating





Exam


                                   Vital Signs











Temp Pulse Resp BP Pulse Ox


 


 98.1 F   64   16   147/79   100 


 


 22 20:47  22 20:47  22 20:47  22 20:47  22 20:47














- General physical appearance


Positive: well developed, no distress, severe pain





- Eyes


Positive: PERRL.  Negative: icteric





- ENT


Positive: no hearing loss





- Respiratory


Positive: normal expansion, normal respiratory effort





- Cardiovascular


Heart Sounds: Present: S1 & S2





- Extremities


Extremities: no ischemia





- Abdomen


Abdomen: Present: soft, distended, other (Tender to palpation in the epigastric 

and right upper quadrant area).  Absent: guarding, rigid





Results





- Labs





                                 22 20:50





                                 22 20:50


                              Abnormal lab results











  22 Range/Units





  20:50 20:50 02:06 


 


WBC  12.2 H    (4.5-11.0)  K/mm3


 


MCH  27 L    (28-32)  pg


 


RDW  15.5 H    (13.2-15.2)  %


 


Seg Neutrophils %  78.2 H    (40.0-70.0)  %


 


Seg Neutrophils #  9.5 H    (1.8-7.7)  K/mm3


 


Sodium   136 L   (137-145)  mmol/L


 


Creatinine   0.4 L   (0.6-1.2)  mg/dL


 


Glucose   107 H   ()  mg/dL


 


Urine pH    8.0 H  (5.0-7.0)  








                                 Diabetes panel











  22 Range/Units





  20:50 


 


Sodium  136 L  (137-145)  mmol/L


 


Potassium  3.7  (3.6-5.0)  mmol/L


 


Chloride  99.2  ()  mmol/L


 


Carbon Dioxide  22  (22-30)  mmol/L


 


BUN  9  (7-17)  mg/dL


 


Creatinine  0.4 L  (0.6-1.2)  mg/dL


 


Glucose  107 H  ()  mg/dL


 


Calcium  9.1  (8.4-10.2)  mg/dL


 


AST  18  (5-40)  units/L


 


ALT  24  (7-56)  units/L


 


Alkaline Phosphatase  92  ()  units/L


 


Total Protein  7.8  (6.3-8.2)  g/dL


 


Albumin  4.5  (3.9-5)  g/dL








                                  Calcium panel











  22 Range/Units





  20:50 


 


Calcium  9.1  (8.4-10.2)  mg/dL


 


Albumin  4.5  (3.9-5)  g/dL








                                 Pituitary panel











  22 Range/Units





  20:50 


 


Sodium  136 L  (137-145)  mmol/L


 


Potassium  3.7  (3.6-5.0)  mmol/L


 


Chloride  99.2  ()  mmol/L


 


Carbon Dioxide  22  (22-30)  mmol/L


 


BUN  9  (7-17)  mg/dL


 


Creatinine  0.4 L  (0.6-1.2)  mg/dL


 


Glucose  107 H  ()  mg/dL


 


Calcium  9.1  (8.4-10.2)  mg/dL








                                  Adrenal panel











  22 Range/Units





  20:50 


 


Sodium  136 L  (137-145)  mmol/L


 


Potassium  3.7  (3.6-5.0)  mmol/L


 


Chloride  99.2  ()  mmol/L


 


Carbon Dioxide  22  (22-30)  mmol/L


 


BUN  9  (7-17)  mg/dL


 


Creatinine  0.4 L  (0.6-1.2)  mg/dL


 


Glucose  107 H  ()  mg/dL


 


Calcium  9.1  (8.4-10.2)  mg/dL


 


Total Bilirubin  0.30  (0.1-1.2)  mg/dL


 


AST  18  (5-40)  units/L


 


ALT  24  (7-56)  units/L


 


Alkaline Phosphatase  92  ()  units/L


 


Total Protein  7.8  (6.3-8.2)  g/dL


 


Albumin  4.5  (3.9-5)  g/dL














- Imaging


US - abdomen: report reviewed, image reviewed





Assessment and Plan





36-year-old female with cholelithiasis and likely cholecystitis.  Patient is 

afebrile and stable with mild leukocytosis.  Discussed pathology and surgery 

with the patient expressed understanding.  Patient is scheduled as an add-on for

 surgery tomorrow for cholecystectomy.  Patient can have clear liquids today and

 made n.p.o. after midnight.

## 2022-06-14 NOTE — EVENT NOTE
Date: 06/14/22


Patient seen and evaluated


Patient with cholelithiasis and possible cholecystitis


For cholecystectomy tomorrow


Patient stable


Medically cleared for surgery

## 2022-06-15 LAB
BASOPHILS # (AUTO): 0 K/MM3 (ref 0–0.1)
BASOPHILS NFR BLD AUTO: 0.1 % (ref 0–1.8)
BUN SERPL-MCNC: 7 MG/DL (ref 7–17)
BUN/CREAT SERPL: 14 %
CALCIUM SERPL-MCNC: 8.3 MG/DL (ref 8.4–10.2)
EOSINOPHIL # BLD AUTO: 0 K/MM3 (ref 0–0.4)
EOSINOPHIL NFR BLD AUTO: 0 % (ref 0–4.3)
HCT VFR BLD CALC: 32.3 % (ref 30.3–42.9)
HEMOLYSIS INDEX: 4
HGB BLD-MCNC: 10.7 GM/DL (ref 10.1–14.3)
LYMPHOCYTES # BLD AUTO: 0.7 K/MM3 (ref 1.2–5.4)
LYMPHOCYTES NFR BLD AUTO: 6 % (ref 13.4–35)
MCHC RBC AUTO-ENTMCNC: 33 % (ref 30–34)
MCV RBC AUTO: 81 FL (ref 79–97)
MONOCYTES # (AUTO): 1.2 K/MM3 (ref 0–0.8)
MONOCYTES % (AUTO): 9.6 % (ref 0–7.3)
PLATELET # BLD: 231 K/MM3 (ref 140–440)
RBC # BLD AUTO: 3.99 M/MM3 (ref 3.65–5.03)

## 2022-06-15 PROCEDURE — 0FT44ZZ RESECTION OF GALLBLADDER, PERCUTANEOUS ENDOSCOPIC APPROACH: ICD-10-PCS | Performed by: SURGERY

## 2022-06-15 RX ADMIN — HEPARIN SODIUM SCH UNIT: 5000 INJECTION, SOLUTION INTRAVENOUS; SUBCUTANEOUS at 21:12

## 2022-06-15 RX ADMIN — MULTIVITAMIN TABLET SCH EACH: TABLET at 11:59

## 2022-06-15 RX ADMIN — PIPERACILLIN AND TAZOBACTAM SCH MLS/HR: 4; .5 INJECTION, POWDER, FOR SOLUTION INTRAVENOUS at 11:58

## 2022-06-15 RX ADMIN — MORPHINE SULFATE PRN MG: 2 INJECTION, SOLUTION INTRAMUSCULAR; INTRAVENOUS at 14:06

## 2022-06-15 RX ADMIN — DEXTROSE AND SODIUM CHLORIDE SCH MLS/HR: 5; .45 INJECTION, SOLUTION INTRAVENOUS at 16:39

## 2022-06-15 RX ADMIN — PANTOPRAZOLE SODIUM SCH MG: 40 INJECTION, POWDER, FOR SOLUTION INTRAVENOUS at 21:13

## 2022-06-15 RX ADMIN — Medication SCH ML: at 11:59

## 2022-06-15 RX ADMIN — PIPERACILLIN AND TAZOBACTAM SCH: 4; .5 INJECTION, POWDER, FOR SOLUTION INTRAVENOUS at 19:05

## 2022-06-15 RX ADMIN — KETOROLAC TROMETHAMINE SCH MG: 30 INJECTION, SOLUTION INTRAMUSCULAR at 19:23

## 2022-06-15 RX ADMIN — MORPHINE SULFATE PRN MG: 4 INJECTION, SOLUTION INTRAMUSCULAR; INTRAVENOUS at 02:57

## 2022-06-15 RX ADMIN — PIPERACILLIN AND TAZOBACTAM SCH MLS/HR: 4; .5 INJECTION, POWDER, FOR SOLUTION INTRAVENOUS at 01:38

## 2022-06-15 RX ADMIN — Medication SCH ML: at 21:13

## 2022-06-15 RX ADMIN — PANTOPRAZOLE SODIUM SCH MG: 40 TABLET, DELAYED RELEASE ORAL at 16:37

## 2022-06-15 RX ADMIN — MORPHINE SULFATE PRN MG: 4 INJECTION, SOLUTION INTRAMUSCULAR; INTRAVENOUS at 06:10

## 2022-06-15 RX ADMIN — PANTOPRAZOLE SODIUM SCH MG: 40 INJECTION, POWDER, FOR SOLUTION INTRAVENOUS at 10:54

## 2022-06-15 RX ADMIN — HEPARIN SODIUM SCH UNIT: 5000 INJECTION, SOLUTION INTRAVENOUS; SUBCUTANEOUS at 11:59

## 2022-06-15 RX ADMIN — OXYCODONE AND ACETAMINOPHEN PRN TAB: 5; 325 TABLET ORAL at 23:08

## 2022-06-15 RX ADMIN — OXYCODONE AND ACETAMINOPHEN PRN TAB: 5; 325 TABLET ORAL at 16:42

## 2022-06-15 RX ADMIN — KETOROLAC TROMETHAMINE SCH MG: 30 INJECTION, SOLUTION INTRAMUSCULAR at 11:59

## 2022-06-15 RX ADMIN — MORPHINE SULFATE PRN MG: 4 INJECTION, SOLUTION INTRAMUSCULAR; INTRAVENOUS at 20:34

## 2022-06-15 RX ADMIN — PANTOPRAZOLE SODIUM SCH: 40 TABLET, DELAYED RELEASE ORAL at 12:04

## 2022-06-15 NOTE — OPERATIVE REPORT
Operative Report


Operative Report: 





Procedure Performed: Lap cholecystectomy


 


Dates of Service: 6/15/2022


 


Primary Surgeon:  Pily Chaudhari MD                 


 


Assisted by: Carmela Dwyer DO


 


Anesthesia: General


 


Pre-Operative Diagnosis:  cholelithiasis, cholecystitis


 


Post-Operative Diagnosis: Cholelithiasis, cholecystitis with hydrops


 


Indications for Procedure: Patient a 36-year-old female presents to the 

emergency room with a 2 to 3-day history of right upper quadrant pain nausea and

vomiting.  On ultrasound she was found to have a large gallstone in neck of her 

gallbladder.  Patient had slightly elevated white count.  Patient was consented 

for laparoscopic cholecystectomy.





Description of Procedure(s):  The patient was brought to the operating room and 

underwent general anesthesia after lower extremity SCD were placed.  The abdomen

was prepped and draped in the standard fashion. IV antibiotics were given and a 

time out was performed. Using a veress needle via a stab incision in the left 

subcostal region, the abdomen was insuflated to a pressure of 15mmHg. Using 

optivew technique, a 5mm trocar was placed just superior and to the left of the 

umbilicus. There was no gross injury noted to any intra-abdominal structures. 

After which working trocars were placed under direct visualization.   A 12 mm 

trocar was placed in the epigastrium, and two more 5  mm trocars were inserted 

in the right lateral sites.   The gallbladder was located and grasped at the 

fundus and retracted up toward the patient's right shoulder.  Prior to being 

able to grasp the fundus of the gallbladder it was decompressed and clear bile 

was aspirated.  The infundibulum was grasped and retracted laterally.  A window 

was made between the cystic artery and the cystic duct, clearly delineating the 

two structures.  These were clipped with a 5 mm clip applier and divided.    The

peritoneum was incised with hook cautery, and the gallbladder was taken from the

liver bed, ensuring hemostatis.   Surgicel powder was sprayed onto the liver bed

for mild generalized oozing.  The endocatch bag was placed in the abdomen and 

the gallbladder was then removed from the abdomen after the skin and fashion as 

the area had to be incised to make the opening large enough to accommodate the 

large gallbladder.  The insufflation was then terminated.  The epigastric 

incision was closed with an 0 Vicryl.  The skin incisions were closed using 4-0 

Monocryl sutures. All the wounds dressed with dermabond. The patient tolerated 

the procedure well, was extubated and taken to the recovery room in satisfactory

condition.


 


Finding(s): Hydrops gallbladder with large gallstone stuck in the neck of the 

gallbladder, significant edema of the gallbladder wall





Intra-Operative Complications:  none


 


Specimens Removed: Gallbladder 


 


Estimated Blood Loss: <15ml





Complications: none immediate

## 2022-06-15 NOTE — ANESTHESIA CONSULTATION
Anesthesia Consult and Med Hx


Date of service: 06/15/22





- Airway


Anesthetic Teeth Evaluation: Good


ROM Head & Neck: Adequate


Mental/Hyoid Distance: Adequate


Mallampati Class: Class I


Intubation Access Assessment: Good





- Pre-Operative Health Status


ASA Pre-Surgery Classification: ASA1


Proposed Anesthetic Plan: General





- Pulmonary


Hx Smoking: No


Hx Asthma: No


COPD: No


Hx Pneumonia: No


Hx Sleep Apnea: No





- Cardiovascular System


Hx Hypertension: No





- Central Nervous System


Hx Seizures: No


Hx Psychiatric Problems: Yes





- Gastrointestinal


Hx Gastroesophageal Reflux Disease: No





- Endocrine


Hx Renal Disease: No


Hx End Stage Renal Disease: No


Hx Hypothyroidism: No


Hx Hyperthyroidism: No





- Hematic


Hx Anemia: No


Hx Sickle Cell Disease: No





- Other Systems


Hx Alcohol Use: No


Hx Obesity: Yes

## 2022-06-15 NOTE — PROGRESS NOTE
Assessment and Plan





Assessment and Plan


VTE prophylaxis?: Chemical


Plan of care discussed with patient/family: Yes





- Patient Problems


(1) Cholelithiasis


Status: Acute   


Plan to address problem: 


S/p cholecystectomy


Postop patient doing well





 2) Intractable abdominal pain


Status: Acute   


Plan to address problem: 


Improved


Started on clears








(3) Intractable nausea and vomiting


Status: Acute   


Plan to address problem: 


Improved





(4) History of 2  sections


Status: Acute   


Plan to address problem: 


Stable.








(5) DVT prophylaxis


Status: Acute   


Plan to address problem: 


Heparin 5000 units subcu every 12 hours for DVT prophylaxis.  Pepcid 20 mg IV ev

lakshmi 12 hours for GI prophylaxis.  Patient is a full code





Discharge planning issues


Possible discharge tomorrow











Subjective


Date of service: 06/15/22


Principal diagnosis: Cholecystitis


Interval history: 








36 year-old female no significant past medical history presents to the hospital 

from urgent care clinic with abdominal pain.  Patient has had upper quadrant 

pain since this morning with associated nausea and vomiting.  No fever reported.

 hx of  x 3.  Pain is moderate to severe in intensity, worse with 

palpation, and no alleviating factors reported.  No urinary symptoms reported.





 CT findings show gallbladder neck stone and distention.  Cannot rule out acute 

cholecystitis.  Patient has mild elevation in WBC count without elevated LFTs, 

lipase, fever, or tachycardia.  Case discussed with general surgery who will 

consult.  Patient will be admitted to the hospitalist.





6/15/2022


S/p cholecystectomy


Patient doing well postop


No complications





Objective





- Constitutional


Vitals: 


                               Vital Signs - 12hr











  06/15/22 06/15/22 06/15/22





  10:08 10:13 10:18


 


Temperature 97.9 F  


 


Pulse Rate 99 H 77 75


 


Respiratory 12 14 13





Rate   


 


Blood Pressure 116/77 108/61 104/55


 


O2 Sat by Pulse 100 100 100





Oximetry   














  06/15/22 06/15/22 06/15/22





  10:30 10:40 10:45


 


Temperature   


 


Pulse Rate 81  77


 


Respiratory 15 18 13





Rate   


 


Blood Pressure 120/68  116/63


 


O2 Sat by Pulse 100 99 97





Oximetry   














  06/15/22 06/15/22 06/15/22





  11:00 11:19 16:08


 


Temperature  97.0 F L 97.0 F L


 


Pulse Rate 79 77 83


 


Respiratory 17 18 18





Rate   


 


Blood Pressure 112/65 108/59 113/57


 


O2 Sat by Pulse 98 95 90





Oximetry   














  06/15/22 06/15/22





  16:11 16:42


 


Temperature  


 


Pulse Rate  


 


Respiratory  18





Rate  


 


Blood Pressure  


 


O2 Sat by Pulse 95 





Oximetry  











General appearance: Present: no acute distress, well-nourished





- EENT


Eyes: PERRL, EOM intact


ENT: hearing intact, clear oral mucosa


Ears: bilateral: normal





- Neck


Neck: supple, normal ROM





- Respiratory


Respiratory effort: normal


Respiratory: bilateral: CTA





- Breasts


Breasts: normal





- Cardiovascular


Heart rate: 76


Rhythm: regular


Heart Sounds: Present: S1 & S2.  Absent: gallop, rub


Extremities: pulses intact, No edema, normal color, Full ROM





- Gastrointestinal


General gastrointestinal: Present: soft, non-tender, non-distended, normal bowel

sounds





- Genitourinary


Female genitourinary: normal





- Integumentary


Integumentary: clear, warm, dry





- Musculoskeletal


Musculoskeletal: 1, strength equal bilaterally





- Neurologic


Neurologic: moves all extremities





- Psychiatric


Psychiatric: memory intact, appropriate mood/affect, intact judgment & insight





- Labs


CBC & Chem 7: 


                                 22 05:53





                                 22 05:53


Labs: 


                              Abnormal lab results











  06/15/22 06/15/22 Range/Units





  05:12 05:12 


 


WBC  12.5 H   (4.5-11.0)  K/mm3


 


MCH  27 L   (28-32)  pg


 


RDW  15.5 H   (13.2-15.2)  %


 


Lymph % (Auto)  6.0 L   (13.4-35.0)  %


 


Mono % (Auto)  9.6 H   (0.0-7.3)  %


 


Lymph # (Auto)  0.7 L   (1.2-5.4)  K/mm3


 


Mono # (Auto)  1.2 H   (0.0-0.8)  K/mm3


 


Seg Neutrophils %  84.3 H   (40.0-70.0)  %


 


Seg Neutrophils #  10.6 H   (1.8-7.7)  K/mm3


 


Sodium   132 L  (137-145)  mmol/L


 


Potassium   3.2 L  (3.6-5.0)  mmol/L


 


Creatinine   0.5 L  (0.6-1.2)  mg/dL


 


Glucose   161 H  ()  mg/dL


 


Calcium   8.3 L  (8.4-10.2)  mg/dL

## 2022-06-16 LAB
ALBUMIN SERPL-MCNC: 3.2 G/DL (ref 3.9–5)
ALT SERPL-CCNC: 30 UNITS/L (ref 7–56)
BASOPHILS # (AUTO): 0 K/MM3 (ref 0–0.1)
BASOPHILS NFR BLD AUTO: 0 % (ref 0–1.8)
BUN SERPL-MCNC: 10 MG/DL (ref 7–17)
BUN/CREAT SERPL: 20 %
CALCIUM SERPL-MCNC: 8.3 MG/DL (ref 8.4–10.2)
EOSINOPHIL # BLD AUTO: 0 K/MM3 (ref 0–0.4)
EOSINOPHIL NFR BLD AUTO: 0 % (ref 0–4.3)
HCT VFR BLD CALC: 27.9 % (ref 30.3–42.9)
HEMOLYSIS INDEX: 8
HGB BLD-MCNC: 9.4 GM/DL (ref 10.1–14.3)
LYMPHOCYTES # BLD AUTO: 1.3 K/MM3 (ref 1.2–5.4)
LYMPHOCYTES NFR BLD AUTO: 12.4 % (ref 13.4–35)
MCHC RBC AUTO-ENTMCNC: 34 % (ref 30–34)
MCV RBC AUTO: 82 FL (ref 79–97)
MONOCYTES # (AUTO): 0.8 K/MM3 (ref 0–0.8)
MONOCYTES % (AUTO): 7.8 % (ref 0–7.3)
PLATELET # BLD: 221 K/MM3 (ref 140–440)
RBC # BLD AUTO: 3.43 M/MM3 (ref 3.65–5.03)

## 2022-06-16 RX ADMIN — PIPERACILLIN AND TAZOBACTAM SCH MLS/HR: 4; .5 INJECTION, POWDER, FOR SOLUTION INTRAVENOUS at 09:13

## 2022-06-16 RX ADMIN — MORPHINE SULFATE PRN MG: 2 INJECTION, SOLUTION INTRAMUSCULAR; INTRAVENOUS at 07:45

## 2022-06-16 RX ADMIN — OXYCODONE AND ACETAMINOPHEN PRN TAB: 5; 325 TABLET ORAL at 18:23

## 2022-06-16 RX ADMIN — OXYCODONE AND ACETAMINOPHEN PRN TAB: 5; 325 TABLET ORAL at 10:44

## 2022-06-16 RX ADMIN — KETOROLAC TROMETHAMINE SCH MG: 30 INJECTION, SOLUTION INTRAMUSCULAR at 00:52

## 2022-06-16 RX ADMIN — MORPHINE SULFATE PRN MG: 4 INJECTION, SOLUTION INTRAMUSCULAR; INTRAVENOUS at 04:44

## 2022-06-16 RX ADMIN — MULTIVITAMIN TABLET SCH EACH: TABLET at 09:06

## 2022-06-16 RX ADMIN — PANTOPRAZOLE SODIUM SCH MG: 40 TABLET, DELAYED RELEASE ORAL at 08:31

## 2022-06-16 RX ADMIN — Medication SCH ML: at 21:44

## 2022-06-16 RX ADMIN — PIPERACILLIN AND TAZOBACTAM SCH: 4; .5 INJECTION, POWDER, FOR SOLUTION INTRAVENOUS at 17:11

## 2022-06-16 RX ADMIN — PIPERACILLIN AND TAZOBACTAM SCH MLS/HR: 4; .5 INJECTION, POWDER, FOR SOLUTION INTRAVENOUS at 02:45

## 2022-06-16 RX ADMIN — MORPHINE SULFATE PRN MG: 4 INJECTION, SOLUTION INTRAMUSCULAR; INTRAVENOUS at 21:44

## 2022-06-16 RX ADMIN — Medication SCH ML: at 09:13

## 2022-06-16 RX ADMIN — PANTOPRAZOLE SODIUM SCH MG: 40 TABLET, DELAYED RELEASE ORAL at 16:48

## 2022-06-16 RX ADMIN — KETOROLAC TROMETHAMINE SCH MG: 30 INJECTION, SOLUTION INTRAMUSCULAR at 06:27

## 2022-06-16 RX ADMIN — HEPARIN SODIUM SCH UNIT: 5000 INJECTION, SOLUTION INTRAVENOUS; SUBCUTANEOUS at 21:44

## 2022-06-16 RX ADMIN — ONDANSETRON PRN MG: 2 INJECTION INTRAMUSCULAR; INTRAVENOUS at 21:44

## 2022-06-16 RX ADMIN — HEPARIN SODIUM SCH UNIT: 5000 INJECTION, SOLUTION INTRAVENOUS; SUBCUTANEOUS at 09:06

## 2022-06-16 RX ADMIN — KETOROLAC TROMETHAMINE SCH MG: 30 INJECTION, SOLUTION INTRAMUSCULAR at 11:36

## 2022-06-16 RX ADMIN — MORPHINE SULFATE PRN MG: 4 INJECTION, SOLUTION INTRAMUSCULAR; INTRAVENOUS at 14:23

## 2022-06-16 RX ADMIN — KETOROLAC TROMETHAMINE SCH MG: 30 INJECTION, SOLUTION INTRAMUSCULAR at 17:10

## 2022-06-16 NOTE — PROGRESS NOTE
Assessment and Plan





POD#1 s/p lap rakesh. Afebrile and stable doing well.OK to discharge from surgery

perspective. Can follow up in the office in two weeks. [phone]


Advance diet as tolerated, avoid fatty greasy food.





Subjective


Date of service: 06/16/22


Narrative: 





No acute events overnight. Pt is tolerating diet and complains of post surgical 

pain that controlled with meds. 





Objective


                               Vital Signs - 12hr











  06/16/22





  11:03


 


O2 Sat by Pulse 98





Oximetry 














- General physical appearance


well developed, no distress, moderate pain





- ENT


no hearing loss





- Respiratory


normal expansion, normal respiratory effort





- Abdomen


soft, not distended, other (incisions c/d/i, appropriatley tender to palpation)





- Labs





                                 06/16/22 05:53





                                 06/16/22 05:53


                                 Diabetes panel











  06/16/22 Range/Units





  05:53 


 


Sodium  137  (137-145)  mmol/L


 


Potassium  3.2 L  (3.6-5.0)  mmol/L


 


Chloride  103.9  ()  mmol/L


 


Carbon Dioxide  23  (22-30)  mmol/L


 


BUN  10  (7-17)  mg/dL


 


Creatinine  0.5 L  (0.6-1.2)  mg/dL


 


Glucose  128 H  ()  mg/dL


 


Calcium  8.3 L  (8.4-10.2)  mg/dL


 


AST  24  (5-40)  units/L


 


ALT  30  (7-56)  units/L


 


Alkaline Phosphatase  72  ()  units/L


 


Total Protein  6.8  (6.3-8.2)  g/dL


 


Albumin  3.2 L  (3.9-5)  g/dL








                                  Calcium panel











  06/16/22 Range/Units





  05:53 


 


Calcium  8.3 L  (8.4-10.2)  mg/dL


 


Albumin  3.2 L  (3.9-5)  g/dL








                                 Pituitary panel











  06/16/22 Range/Units





  05:53 


 


Sodium  137  (137-145)  mmol/L


 


Potassium  3.2 L  (3.6-5.0)  mmol/L


 


Chloride  103.9  ()  mmol/L


 


Carbon Dioxide  23  (22-30)  mmol/L


 


BUN  10  (7-17)  mg/dL


 


Creatinine  0.5 L  (0.6-1.2)  mg/dL


 


Glucose  128 H  ()  mg/dL


 


Calcium  8.3 L  (8.4-10.2)  mg/dL








                                  Adrenal panel











  06/16/22 Range/Units





  05:53 


 


Sodium  137  (137-145)  mmol/L


 


Potassium  3.2 L  (3.6-5.0)  mmol/L


 


Chloride  103.9  ()  mmol/L


 


Carbon Dioxide  23  (22-30)  mmol/L


 


BUN  10  (7-17)  mg/dL


 


Creatinine  0.5 L  (0.6-1.2)  mg/dL


 


Glucose  128 H  ()  mg/dL


 


Calcium  8.3 L  (8.4-10.2)  mg/dL


 


Total Bilirubin  0.30  (0.1-1.2)  mg/dL


 


AST  24  (5-40)  units/L


 


ALT  30  (7-56)  units/L


 


Alkaline Phosphatase  72  ()  units/L


 


Total Protein  6.8  (6.3-8.2)  g/dL


 


Albumin  3.2 L  (3.9-5)  g/dL

## 2022-06-17 VITALS — SYSTOLIC BLOOD PRESSURE: 128 MMHG | DIASTOLIC BLOOD PRESSURE: 74 MMHG

## 2022-06-17 RX ADMIN — KETOROLAC TROMETHAMINE SCH MG: 30 INJECTION, SOLUTION INTRAMUSCULAR at 00:51

## 2022-06-17 RX ADMIN — PIPERACILLIN AND TAZOBACTAM SCH MLS/HR: 4; .5 INJECTION, POWDER, FOR SOLUTION INTRAVENOUS at 00:52

## 2022-06-17 RX ADMIN — KETOROLAC TROMETHAMINE SCH: 30 INJECTION, SOLUTION INTRAMUSCULAR at 05:45

## 2022-06-17 RX ADMIN — PIPERACILLIN AND TAZOBACTAM SCH: 4; .5 INJECTION, POWDER, FOR SOLUTION INTRAVENOUS at 01:02

## 2022-06-17 RX ADMIN — PANTOPRAZOLE SODIUM SCH MG: 40 TABLET, DELAYED RELEASE ORAL at 08:56

## 2022-07-18 NOTE — DISCHARGE SUMMARY
Providers





- Providers


Date of Admission: 


06/14/22 02:42





Date of discharge: 06/16/22


Attending physician: 


MARIEL RUCKER





                                        





06/14/22 02:17


Consult to Physician [CONS] Urgent 


   Comment: Dr. Davis spoke with Dr. Chaudhari @ [phone]


   Consulting Provider: JOSIAS CHAUDHARI


   Physician Instructions: 


   Reason For Exam: gallbladder distention, stone, n,v,pain











Primary care physician: 


YAKOV FARIA








Hospitalization


Condition: Stable


Disposition: 30 STILL A PATIENT





Exam





- Constitutional


Vitals: 


                                        











Temp Pulse Resp BP Pulse Ox


 


 97.0 F L  83   16   113/57   98 


 


 06/15/22 16:08  06/15/22 16:08  06/16/22 00:00  06/15/22 16:08  06/16/22 11:03














Plan


Follow up with: 


YAKOV FARIA MD [Primary Care Provider] - 7 Days No